# Patient Record
Sex: FEMALE | Race: WHITE | NOT HISPANIC OR LATINO
[De-identification: names, ages, dates, MRNs, and addresses within clinical notes are randomized per-mention and may not be internally consistent; named-entity substitution may affect disease eponyms.]

---

## 2017-01-30 ENCOUNTER — APPOINTMENT (OUTPATIENT)
Dept: OBGYN | Facility: CLINIC | Age: 77
End: 2017-01-30

## 2017-01-30 VITALS — DIASTOLIC BLOOD PRESSURE: 80 MMHG | SYSTOLIC BLOOD PRESSURE: 130 MMHG

## 2017-06-13 ENCOUNTER — APPOINTMENT (OUTPATIENT)
Dept: OBGYN | Facility: CLINIC | Age: 77
End: 2017-06-13

## 2017-06-13 VITALS — SYSTOLIC BLOOD PRESSURE: 120 MMHG | DIASTOLIC BLOOD PRESSURE: 70 MMHG

## 2017-09-28 ENCOUNTER — APPOINTMENT (OUTPATIENT)
Dept: OBGYN | Facility: CLINIC | Age: 77
End: 2017-09-28
Payer: MEDICARE

## 2017-09-28 VITALS — DIASTOLIC BLOOD PRESSURE: 70 MMHG | SYSTOLIC BLOOD PRESSURE: 130 MMHG

## 2017-09-28 DIAGNOSIS — F17.210 NICOTINE DEPENDENCE, CIGARETTES, UNCOMPLICATED: ICD-10-CM

## 2017-09-28 DIAGNOSIS — N81.9 FEMALE GENITAL PROLAPSE, UNSPECIFIED: ICD-10-CM

## 2017-09-28 PROCEDURE — 99214 OFFICE O/P EST MOD 30 MIN: CPT

## 2017-09-28 RX ORDER — CYCLOBENZAPRINE HYDROCHLORIDE 10 MG/1
10 TABLET, FILM COATED ORAL
Qty: 20 | Refills: 0 | Status: COMPLETED | COMMUNITY
Start: 2016-11-15

## 2017-09-28 RX ORDER — ATORVASTATIN CALCIUM 10 MG/1
10 TABLET, FILM COATED ORAL
Qty: 90 | Refills: 0 | Status: ACTIVE | COMMUNITY
Start: 2017-07-19

## 2017-09-28 RX ORDER — LOSARTAN POTASSIUM AND HYDROCHLOROTHIAZIDE 25; 100 MG/1; MG/1
100-25 TABLET ORAL
Qty: 90 | Refills: 0 | Status: ACTIVE | COMMUNITY
Start: 2017-04-04

## 2017-10-02 ENCOUNTER — OTHER (OUTPATIENT)
Age: 77
End: 2017-10-02

## 2018-02-12 ENCOUNTER — APPOINTMENT (OUTPATIENT)
Dept: OBGYN | Facility: CLINIC | Age: 78
End: 2018-02-12
Payer: MEDICARE

## 2018-02-12 VITALS — SYSTOLIC BLOOD PRESSURE: 140 MMHG | DIASTOLIC BLOOD PRESSURE: 80 MMHG

## 2018-02-12 PROCEDURE — 99213 OFFICE O/P EST LOW 20 MIN: CPT

## 2018-02-23 ENCOUNTER — APPOINTMENT (OUTPATIENT)
Dept: OBGYN | Facility: CLINIC | Age: 78
End: 2018-02-23
Payer: MEDICARE

## 2018-02-23 VITALS — DIASTOLIC BLOOD PRESSURE: 80 MMHG | SYSTOLIC BLOOD PRESSURE: 140 MMHG

## 2018-02-23 PROCEDURE — 99213 OFFICE O/P EST LOW 20 MIN: CPT

## 2018-03-02 ENCOUNTER — APPOINTMENT (OUTPATIENT)
Dept: OBGYN | Facility: CLINIC | Age: 78
End: 2018-03-02
Payer: MEDICARE

## 2018-03-02 VITALS — SYSTOLIC BLOOD PRESSURE: 130 MMHG | DIASTOLIC BLOOD PRESSURE: 80 MMHG

## 2018-03-02 PROCEDURE — 99213 OFFICE O/P EST LOW 20 MIN: CPT

## 2018-06-26 ENCOUNTER — APPOINTMENT (OUTPATIENT)
Dept: OBGYN | Facility: CLINIC | Age: 78
End: 2018-06-26
Payer: MEDICARE

## 2018-06-26 VITALS — DIASTOLIC BLOOD PRESSURE: 80 MMHG | SYSTOLIC BLOOD PRESSURE: 130 MMHG

## 2018-06-26 PROCEDURE — 99213 OFFICE O/P EST LOW 20 MIN: CPT

## 2018-10-01 ENCOUNTER — APPOINTMENT (OUTPATIENT)
Dept: OBGYN | Facility: CLINIC | Age: 78
End: 2018-10-01
Payer: MEDICARE

## 2018-10-01 VITALS
BODY MASS INDEX: 21.17 KG/M2 | DIASTOLIC BLOOD PRESSURE: 80 MMHG | SYSTOLIC BLOOD PRESSURE: 135 MMHG | HEIGHT: 64 IN | WEIGHT: 124 LBS

## 2018-10-01 PROCEDURE — 99214 OFFICE O/P EST MOD 30 MIN: CPT

## 2019-03-04 ENCOUNTER — APPOINTMENT (OUTPATIENT)
Dept: OBGYN | Facility: CLINIC | Age: 79
End: 2019-03-04

## 2019-03-07 ENCOUNTER — APPOINTMENT (OUTPATIENT)
Dept: OBGYN | Facility: CLINIC | Age: 79
End: 2019-03-07
Payer: MEDICARE

## 2019-03-07 VITALS
WEIGHT: 124 LBS | BODY MASS INDEX: 21.17 KG/M2 | SYSTOLIC BLOOD PRESSURE: 130 MMHG | DIASTOLIC BLOOD PRESSURE: 80 MMHG | HEIGHT: 64 IN

## 2019-03-07 DIAGNOSIS — N95.0 POSTMENOPAUSAL BLEEDING: ICD-10-CM

## 2019-03-07 PROCEDURE — 99213 OFFICE O/P EST LOW 20 MIN: CPT

## 2019-03-07 NOTE — PHYSICAL EXAM
[Labia Majora] : labia major [Labia Minora] : labia minora [Cystocele] : a cystocele [Uterine Prolapse] : uterine prolapse [Normal] : uterus [Uterine Adnexae] : were not tender and not enlarged [Adnexa Tenderness] : were not tender [Ovarian Mass (___ Cm)] : there were no adnexal masses [FreeTextEntry4] : ring with support removed and cleaned, abrasion in right lateral fornix noted, bleeding.

## 2019-03-19 ENCOUNTER — APPOINTMENT (OUTPATIENT)
Dept: OBGYN | Facility: CLINIC | Age: 79
End: 2019-03-19
Payer: MEDICARE

## 2019-03-19 VITALS — SYSTOLIC BLOOD PRESSURE: 130 MMHG | DIASTOLIC BLOOD PRESSURE: 80 MMHG

## 2019-03-19 PROCEDURE — 99213 OFFICE O/P EST LOW 20 MIN: CPT

## 2019-03-19 NOTE — PHYSICAL EXAM
[Normal] : cervix [Cystocele] : a cystocele [Labia Majora] : labia major [Labia Minora] : labia minora [Uterine Prolapse] : uterine prolapse [Ovarian Mass (___ Cm)] : there were no adnexal masses [Adnexa Tenderness] : were not tender [FreeTextEntry4] : abrasion healed. Ring with support pessary (#5?) reinserted but felt too big to me.

## 2019-03-19 NOTE — CHIEF COMPLAINT
[Follow Up] : follow up GYN visit [FreeTextEntry1] : pt here for fu and reinsertion of pessary. Had tvus in meantime which showed 2mm endo lining and nml ovaries.

## 2019-08-07 ENCOUNTER — APPOINTMENT (OUTPATIENT)
Dept: OBGYN | Facility: CLINIC | Age: 79
End: 2019-08-07
Payer: MEDICARE

## 2019-08-07 VITALS
WEIGHT: 117.72 LBS | DIASTOLIC BLOOD PRESSURE: 70 MMHG | BODY MASS INDEX: 20.1 KG/M2 | HEIGHT: 64 IN | SYSTOLIC BLOOD PRESSURE: 120 MMHG

## 2019-08-07 DIAGNOSIS — Z87.39 PERSONAL HISTORY OF OTHER DISEASES OF THE MUSCULOSKELETAL SYSTEM AND CONNECTIVE TISSUE: ICD-10-CM

## 2019-08-07 PROCEDURE — 99214 OFFICE O/P EST MOD 30 MIN: CPT

## 2019-08-07 NOTE — CHIEF COMPLAINT
[Follow Up] : follow up GYN visit [FreeTextEntry1] : Had BMD done by PMD in June 2019 which showed osteoporosis in right hip. Fosamax recommended by PMD which she was on in the past but 10 yrs ago.\dayanara blevins is working well. No vb.

## 2019-08-07 NOTE — PHYSICAL EXAM
[Normal] : urethra [Labia Majora] : labia major [Labia Minora] : labia minora [Cystocele] : a cystocele [Uterine Prolapse] : uterine prolapse [Adnexa Tenderness] : were not tender [Ovarian Mass (___ Cm)] : there were no adnexal masses [FreeTextEntry4] : No abrasion. Ring with support pessary (#5?) reinserted but felt too big to me.

## 2019-09-18 ENCOUNTER — OTHER (OUTPATIENT)
Age: 79
End: 2019-09-18

## 2019-09-19 ENCOUNTER — OTHER (OUTPATIENT)
Age: 79
End: 2019-09-19

## 2019-09-19 LAB
25(OH)D3 SERPL-MCNC: 38.1 NG/ML
CALCIUM SERPL-MCNC: 11.1 MG/DL

## 2019-11-08 ENCOUNTER — APPOINTMENT (OUTPATIENT)
Dept: OBGYN | Facility: CLINIC | Age: 79
End: 2019-11-08
Payer: MEDICARE

## 2019-11-08 VITALS
HEIGHT: 64 IN | TEMPERATURE: 97.7 F | BODY MASS INDEX: 19.97 KG/M2 | SYSTOLIC BLOOD PRESSURE: 120 MMHG | WEIGHT: 117 LBS | DIASTOLIC BLOOD PRESSURE: 80 MMHG

## 2019-11-08 PROCEDURE — 99213 OFFICE O/P EST LOW 20 MIN: CPT

## 2019-11-08 NOTE — CHIEF COMPLAINT
[Follow Up] : follow up GYN visit [FreeTextEntry1] : pt here for pessary check. Since last visit her  passed from a heart attack and then she broke her hip a few weeks later. She was recently dx with osteoporosis and recommended t rheum because of hypercalcemia but she had to cancel appts because of everything else that was annalise on. Her appt is for Dec 3rd.\par She is happy with pessary, no complaints, no vb.

## 2019-11-08 NOTE — PHYSICAL EXAM
[Labia Majora] : labia major [Normal] : cervix [Labia Minora] : labia minora [Uterine Prolapse] : uterine prolapse [Adnexa Tenderness] : were not tender [Cystocele] : a cystocele [Ovarian Mass (___ Cm)] : there were no adnexal masses [FreeTextEntry4] : No abrasion. Ring with support pessary (#5?)

## 2020-03-05 ENCOUNTER — APPOINTMENT (OUTPATIENT)
Dept: OBGYN | Facility: CLINIC | Age: 80
End: 2020-03-05
Payer: MEDICARE

## 2020-03-05 VITALS
HEIGHT: 64 IN | BODY MASS INDEX: 20.49 KG/M2 | SYSTOLIC BLOOD PRESSURE: 140 MMHG | WEIGHT: 120 LBS | DIASTOLIC BLOOD PRESSURE: 70 MMHG

## 2020-03-05 DIAGNOSIS — Z12.39 ENCOUNTER FOR OTHER SCREENING FOR MALIGNANT NEOPLASM OF BREAST: ICD-10-CM

## 2020-03-05 PROCEDURE — 99213 OFFICE O/P EST LOW 20 MIN: CPT

## 2020-03-05 NOTE — CHIEF COMPLAINT
[Follow Up] : follow up GYN visit [FreeTextEntry1] : Saw Dr. Whyte in December. Started on fosomax.\par No issues with pessary. No vb. overdue for mammo

## 2020-03-05 NOTE — PHYSICAL EXAM
[Normal] : cervix [Labia Majora] : labia major [Labia Minora] : labia minora [Cystocele] : a cystocele [Uterine Prolapse] : uterine prolapse [Adnexa Tenderness] : were not tender [Ovarian Mass (___ Cm)] : there were no adnexal masses [FreeTextEntry4] : No abrasion. Ring with support pessary (#5?) removed, cleaned and replaced with estrogen cream

## 2020-06-25 ENCOUNTER — APPOINTMENT (OUTPATIENT)
Dept: OBGYN | Facility: CLINIC | Age: 80
End: 2020-06-25
Payer: MEDICARE

## 2020-06-25 VITALS — DIASTOLIC BLOOD PRESSURE: 70 MMHG | SYSTOLIC BLOOD PRESSURE: 140 MMHG | RESPIRATION RATE: 16 BRPM | TEMPERATURE: 98.5 F

## 2020-06-25 PROCEDURE — 99214 OFFICE O/P EST MOD 30 MIN: CPT

## 2020-10-08 ENCOUNTER — APPOINTMENT (OUTPATIENT)
Dept: OBGYN | Facility: CLINIC | Age: 80
End: 2020-10-08
Payer: MEDICARE

## 2020-10-08 VITALS
DIASTOLIC BLOOD PRESSURE: 70 MMHG | HEIGHT: 64 IN | BODY MASS INDEX: 20.32 KG/M2 | SYSTOLIC BLOOD PRESSURE: 138 MMHG | RESPIRATION RATE: 16 BRPM | WEIGHT: 119 LBS

## 2020-10-08 PROCEDURE — 99213 OFFICE O/P EST LOW 20 MIN: CPT

## 2020-10-08 NOTE — PHYSICAL EXAM
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

## 2020-10-08 NOTE — HISTORY OF PRESENT ILLNESS
[FreeTextEntry1] : Pt here for pessary check. No vb, happy with it.\par Also has osteoporosis, sees rheum, on fosomax, not on calcium because of elevated levels.\par  [Mammogramdate] : 3/2020 [BoneDensityDate] : 6/2019

## 2020-11-24 ENCOUNTER — TRANSCRIPTION ENCOUNTER (OUTPATIENT)
Age: 80
End: 2020-11-24

## 2021-01-25 ENCOUNTER — APPOINTMENT (OUTPATIENT)
Dept: OBGYN | Facility: CLINIC | Age: 81
End: 2021-01-25
Payer: MEDICARE

## 2021-01-25 VITALS
HEIGHT: 64 IN | OXYGEN SATURATION: 98 % | WEIGHT: 123 LBS | DIASTOLIC BLOOD PRESSURE: 70 MMHG | RESPIRATION RATE: 18 BRPM | SYSTOLIC BLOOD PRESSURE: 120 MMHG | BODY MASS INDEX: 21 KG/M2

## 2021-01-25 DIAGNOSIS — Z12.31 ENCOUNTER FOR SCREENING MAMMOGRAM FOR MALIGNANT NEOPLASM OF BREAST: ICD-10-CM

## 2021-01-25 PROCEDURE — 99214 OFFICE O/P EST MOD 30 MIN: CPT

## 2021-01-25 NOTE — HISTORY OF PRESENT ILLNESS
[TextBox_4] : Being monitored for elevated wbc\par Still on fosamax for bones through rheum, tolerating well. Takes vit d but not calcium due to mildly elevated levels.\par Happy with pessary, no bleeding [Mammogramdate] : 3/2020 [BoneDensityDate] : 6/19 [ColonoscopyDate] : does cologuard q yr 8

## 2021-01-25 NOTE — PHYSICAL EXAM
[Appropriately responsive] : appropriately responsive [Alert] : alert [No Acute Distress] : no acute distress [Soft] : soft [Non-tender] : non-tender [Non-distended] : non-distended [No HSM] : No HSM [No Lesions] : no lesions [No Mass] : no mass [Oriented x3] : oriented x3 [Examination Of The Breasts] : a normal appearance [No Masses] : no breast masses were palpable [Vulvar Atrophy] : vulvar atrophy [Labia Majora] : normal [Labia Minora] : normal [Atrophy] : atrophy [Normal] : normal [Tenderness] : nontender [Enlarged ___ wks] : not enlarged [Mass ___ cm] : no uterine mass was palpated [Uterine Adnexae] : non-palpable [No Tenderness] : no tenderness [FreeTextEntry1] : small (.5cm) pimple/abscess on left labia majora [FreeTextEntry4] : pessary removed, cleaned and replaced, minimal abrasion on left vaginal fornix [FreeTextEntry9] : guaiac-

## 2021-04-26 ENCOUNTER — APPOINTMENT (OUTPATIENT)
Dept: OBGYN | Facility: CLINIC | Age: 81
End: 2021-04-26
Payer: MEDICARE

## 2021-04-26 VITALS
SYSTOLIC BLOOD PRESSURE: 120 MMHG | BODY MASS INDEX: 20.83 KG/M2 | DIASTOLIC BLOOD PRESSURE: 62 MMHG | WEIGHT: 122 LBS | HEIGHT: 64 IN

## 2021-04-26 PROCEDURE — 99213 OFFICE O/P EST LOW 20 MIN: CPT

## 2021-04-26 NOTE — PHYSICAL EXAM
[Appropriately responsive] : appropriately responsive [Alert] : alert [No Acute Distress] : no acute distress [Soft] : soft [Non-tender] : non-tender [Non-distended] : non-distended [No HSM] : No HSM [No Lesions] : no lesions [No Mass] : no mass [Oriented x3] : oriented x3 [Vulvar Atrophy] : vulvar atrophy [Labia Majora] : normal [Labia Minora] : normal [Atrophy] : atrophy [Normal] : normal [Tenderness] : nontender [Enlarged ___ wks] : not enlarged [Mass ___ cm] : no uterine mass was palpated [No Tenderness] : no tenderness [FreeTextEntry4] : pessary removed, cleaned and replaced [FreeTextEntry9] : guaiac-

## 2021-07-26 ENCOUNTER — APPOINTMENT (OUTPATIENT)
Dept: OBGYN | Facility: CLINIC | Age: 81
End: 2021-07-26
Payer: MEDICARE

## 2021-07-26 VITALS
RESPIRATION RATE: 12 BRPM | DIASTOLIC BLOOD PRESSURE: 60 MMHG | WEIGHT: 117 LBS | BODY MASS INDEX: 19.97 KG/M2 | HEIGHT: 64 IN | SYSTOLIC BLOOD PRESSURE: 100 MMHG

## 2021-07-26 PROCEDURE — 99212 OFFICE O/P EST SF 10 MIN: CPT

## 2021-10-25 ENCOUNTER — APPOINTMENT (OUTPATIENT)
Dept: OBGYN | Facility: CLINIC | Age: 81
End: 2021-10-25

## 2021-12-03 ENCOUNTER — APPOINTMENT (OUTPATIENT)
Dept: OBGYN | Facility: CLINIC | Age: 81
End: 2021-12-03
Payer: MEDICARE

## 2021-12-03 VITALS
SYSTOLIC BLOOD PRESSURE: 108 MMHG | DIASTOLIC BLOOD PRESSURE: 62 MMHG | WEIGHT: 115 LBS | TEMPERATURE: 97.7 F | BODY MASS INDEX: 19.74 KG/M2

## 2021-12-03 PROCEDURE — 99213 OFFICE O/P EST LOW 20 MIN: CPT

## 2021-12-03 RX ORDER — ALENDRONATE SODIUM 70 MG/1
70 TABLET ORAL
Refills: 0 | Status: DISCONTINUED | COMMUNITY
Start: 2020-10-08 | End: 2021-12-03

## 2021-12-03 NOTE — PHYSICAL EXAM
[Appropriately responsive] : appropriately responsive [Alert] : alert [No Acute Distress] : no acute distress [Soft] : soft [Non-tender] : non-tender [Non-distended] : non-distended Hide Include Location In Plan Question?: No [No HSM] : No HSM Detail Level: Zone [No Lesions] : no lesions [No Mass] : no mass [Oriented x3] : oriented x3 [Vulvar Atrophy] : vulvar atrophy [Labia Majora] : normal [Labia Minora] : normal [Atrophy] : atrophy [Normal] : normal [Tenderness] : nontender [Enlarged ___ wks] : not enlarged [Mass ___ cm] : no uterine mass was palpated [FreeTextEntry4] : pessary removed, cleaned and replaced with estrogen cream, no abrasions

## 2021-12-03 NOTE — HISTORY OF PRESENT ILLNESS
[FreeTextEntry1] : had colonoscopy and endoscopy, dx with h pylori\par Recent BMD showed osteopenia\par not on fosamax since stomach issues. Has appt with rheum next month to discuss reclast\par Pessary is fine, no vb\par lost weight

## 2022-01-31 ENCOUNTER — APPOINTMENT (OUTPATIENT)
Dept: OBGYN | Facility: CLINIC | Age: 82
End: 2022-01-31

## 2022-02-04 ENCOUNTER — APPOINTMENT (OUTPATIENT)
Dept: OBGYN | Facility: CLINIC | Age: 82
End: 2022-02-04
Payer: MEDICARE

## 2022-02-04 VITALS
WEIGHT: 109 LBS | DIASTOLIC BLOOD PRESSURE: 70 MMHG | TEMPERATURE: 98 F | HEIGHT: 64 IN | BODY MASS INDEX: 18.61 KG/M2 | SYSTOLIC BLOOD PRESSURE: 110 MMHG

## 2022-02-04 PROCEDURE — G0101: CPT

## 2022-02-04 NOTE — PHYSICAL EXAM
[Appropriately responsive] : appropriately responsive [Alert] : alert [No Acute Distress] : no acute distress [Soft] : soft [Non-tender] : non-tender [Non-distended] : non-distended [No HSM] : No HSM [No Lesions] : no lesions [No Mass] : no mass [Oriented x3] : oriented x3 [Examination Of The Breasts] : a normal appearance [No Masses] : no breast masses were palpable [Vulvar Atrophy] : vulvar atrophy [Labia Majora] : normal [Labia Minora] : normal [Atrophy] : atrophy [Normal] : normal [Tenderness] : nontender [Enlarged ___ wks] : not enlarged [Mass ___ cm] : no uterine mass was palpated [Uterine Adnexae] : non-palpable [FreeTextEntry4] : pessary removed, cleaned and replaced with estrogen cream, no abrasions [FreeTextEntry9] : deferred secondary to recent colonoscopy

## 2022-02-04 NOTE — HISTORY OF PRESENT ILLNESS
[TextBox_4] : Switched from fosamax to prolia 2 weeks ago because fosamax wasn’t working.\par  Takes vit d but not calcium due to mildly elevated levels.\par Happy with pessary, no bleeding [Mammogramdate] : 3/2021 [BoneDensityDate] : 1 month ago [ColonoscopyDate] : 10/2021

## 2022-05-06 ENCOUNTER — APPOINTMENT (OUTPATIENT)
Dept: OBGYN | Facility: CLINIC | Age: 82
End: 2022-05-06

## 2022-05-09 ENCOUNTER — APPOINTMENT (OUTPATIENT)
Dept: OBGYN | Facility: CLINIC | Age: 82
End: 2022-05-09
Payer: MEDICARE

## 2022-05-09 VITALS
TEMPERATURE: 97.8 F | SYSTOLIC BLOOD PRESSURE: 135 MMHG | HEART RATE: 88 BPM | BODY MASS INDEX: 19.12 KG/M2 | DIASTOLIC BLOOD PRESSURE: 62 MMHG | WEIGHT: 112 LBS | RESPIRATION RATE: 14 BRPM | HEIGHT: 64 IN

## 2022-05-09 DIAGNOSIS — N81.9 FEMALE GENITAL PROLAPSE, UNSPECIFIED: ICD-10-CM

## 2022-05-09 PROCEDURE — 99213 OFFICE O/P EST LOW 20 MIN: CPT

## 2022-05-09 NOTE — PHYSICAL EXAM
[Appropriately responsive] : appropriately responsive [Alert] : alert [No Acute Distress] : no acute distress [Soft] : soft [Non-tender] : non-tender [Non-distended] : non-distended [No HSM] : No HSM [No Lesions] : no lesions [No Mass] : no mass [Oriented x3] : oriented x3 [Vulvar Atrophy] : vulvar atrophy [Labia Majora] : normal [Labia Minora] : normal [Atrophy] : atrophy [Normal] : normal [Tenderness] : nontender [Enlarged ___ wks] : not enlarged [Mass ___ cm] : no uterine mass was palpated [Uterine Adnexae] : non-palpable [FreeTextEntry4] : pessary removed, cleaned and replaced with estrogen cream, no abrasions [FreeTextEntry9] : deferred secondary to recent colonoscopy

## 2022-08-22 ENCOUNTER — APPOINTMENT (OUTPATIENT)
Dept: OBGYN | Facility: CLINIC | Age: 82
End: 2022-08-22

## 2022-08-22 VITALS
TEMPERATURE: 98 F | BODY MASS INDEX: 18.54 KG/M2 | WEIGHT: 108 LBS | DIASTOLIC BLOOD PRESSURE: 70 MMHG | SYSTOLIC BLOOD PRESSURE: 130 MMHG

## 2022-08-22 PROCEDURE — 99212 OFFICE O/P EST SF 10 MIN: CPT

## 2022-08-22 NOTE — PHYSICAL EXAM
I have bad abdominal pain and I just finished my period. I might have endometriosis but today is was worse than usual - pt [Appropriately responsive] : appropriately responsive [Alert] : alert [No Acute Distress] : no acute distress [Soft] : soft [Non-tender] : non-tender [Non-distended] : non-distended [No HSM] : No HSM [No Lesions] : no lesions [No Mass] : no mass [Oriented x3] : oriented x3 [Vulvar Atrophy] : vulvar atrophy [Labia Majora] : normal [Labia Minora] : normal [Atrophy] : atrophy [Normal] : normal [Tenderness] : nontender [Enlarged ___ wks] : not enlarged [Mass ___ cm] : no uterine mass was palpated [Uterine Adnexae] : non-palpable [FreeTextEntry4] : pessary removed, cleaned and replaced with estrogen cream, no abrasions [FreeTextEntry9] : deferred secondary to recent colonoscopy

## 2022-11-21 ENCOUNTER — APPOINTMENT (OUTPATIENT)
Dept: OBGYN | Facility: CLINIC | Age: 82
End: 2022-11-21

## 2022-11-21 VITALS
WEIGHT: 102 LBS | SYSTOLIC BLOOD PRESSURE: 132 MMHG | HEIGHT: 64 IN | HEART RATE: 92 BPM | BODY MASS INDEX: 17.42 KG/M2 | TEMPERATURE: 98.2 F | DIASTOLIC BLOOD PRESSURE: 77 MMHG | RESPIRATION RATE: 22 BRPM

## 2022-11-21 PROCEDURE — 99213 OFFICE O/P EST LOW 20 MIN: CPT

## 2022-11-21 NOTE — HISTORY OF PRESENT ILLNESS
[FreeTextEntry1] : Pt here for pessary check no complaints\par wants to do prolia here instead of with rheum because its too far.\par Next dose due in january and will get with rheum.

## 2022-11-21 NOTE — PHYSICAL EXAM
[Appropriately responsive] : appropriately responsive [Alert] : alert [No Acute Distress] : no acute distress [Soft] : soft [Non-tender] : non-tender [Non-distended] : non-distended [No HSM] : No HSM [No Lesions] : no lesions [No Mass] : no mass [Oriented x3] : oriented x3 [Vulvar Atrophy] : vulvar atrophy [Labia Majora] : normal [Labia Minora] : normal [Atrophy] : atrophy [Normal] : normal [Tenderness] : nontender [Enlarged ___ wks] : not enlarged [Mass ___ cm] : no uterine mass was palpated [FreeTextEntry4] : pessary removed, cleaned and replaced with estrogen cream, no abrasions

## 2023-01-23 ENCOUNTER — APPOINTMENT (OUTPATIENT)
Dept: OBGYN | Facility: CLINIC | Age: 83
End: 2023-01-23
Payer: MEDICARE

## 2023-01-23 VITALS
SYSTOLIC BLOOD PRESSURE: 132 MMHG | WEIGHT: 101 LBS | BODY MASS INDEX: 17.34 KG/M2 | TEMPERATURE: 98 F | DIASTOLIC BLOOD PRESSURE: 76 MMHG

## 2023-01-23 DIAGNOSIS — M81.0 AGE-RELATED OSTEOPOROSIS W/OUT CURRENT PATHOLOGICAL FRACTURE: ICD-10-CM

## 2023-01-23 PROCEDURE — 99214 OFFICE O/P EST MOD 30 MIN: CPT

## 2023-01-23 NOTE — PHYSICAL EXAM
[Appropriately responsive] : appropriately responsive [Alert] : alert [No Acute Distress] : no acute distress [Soft] : soft [Non-tender] : non-tender [Non-distended] : non-distended [No HSM] : No HSM [No Lesions] : no lesions [No Mass] : no mass [Oriented x3] : oriented x3 [Examination Of The Breasts] : a normal appearance [No Masses] : no breast masses were palpable [Vulvar Atrophy] : vulvar atrophy [Labia Majora] : normal [Labia Minora] : normal [Atrophy] : atrophy [Normal] : normal [Tenderness] : nontender [Enlarged ___ wks] : not enlarged [Mass ___ cm] : no uterine mass was palpated [Uterine Adnexae] : non-palpable [No Tenderness] : no tenderness [FreeTextEntry4] : pessary removed, cleaned and replaced with estrogen cream, no abrasions [FreeTextEntry9] : guaiac-

## 2023-01-23 NOTE — HISTORY OF PRESENT ILLNESS
[TextBox_4] : On prolia now through rheum for osteoporosis, has appt next week for injection.\par Takes vit d but not calcium due to mildly elevated levels.\par Happy with pessary, no bleeding [Mammogramdate] : 3/2022 [BoneDensityDate] :  2021 [ColonoscopyDate] : 10/2021

## 2023-05-15 ENCOUNTER — APPOINTMENT (OUTPATIENT)
Dept: OBGYN | Facility: CLINIC | Age: 83
End: 2023-05-15
Payer: MEDICARE

## 2023-05-15 VITALS
RESPIRATION RATE: 18 BRPM | HEIGHT: 64 IN | WEIGHT: 104 LBS | HEART RATE: 88 BPM | DIASTOLIC BLOOD PRESSURE: 78 MMHG | BODY MASS INDEX: 17.75 KG/M2 | SYSTOLIC BLOOD PRESSURE: 130 MMHG | TEMPERATURE: 98.2 F

## 2023-05-15 PROCEDURE — 99213 OFFICE O/P EST LOW 20 MIN: CPT

## 2023-05-15 RX ORDER — ESTRADIOL 0.1 MG/G
0.1 CREAM VAGINAL
Qty: 1 | Refills: 0 | Status: ACTIVE | COMMUNITY
Start: 2017-06-13 | End: 1900-01-01

## 2023-05-15 NOTE — HISTORY OF PRESENT ILLNESS
[FreeTextEntry1] : Pt here for pessary check no complaints\par no vb, needs more estrace cream.\par Hasn't done mammo yet

## 2023-10-23 ENCOUNTER — APPOINTMENT (OUTPATIENT)
Dept: OBGYN | Facility: CLINIC | Age: 83
End: 2023-10-23
Payer: MEDICARE

## 2023-10-23 ENCOUNTER — APPOINTMENT (OUTPATIENT)
Dept: OBGYN | Facility: CLINIC | Age: 83
End: 2023-10-23

## 2023-10-23 VITALS
DIASTOLIC BLOOD PRESSURE: 80 MMHG | WEIGHT: 102 LBS | SYSTOLIC BLOOD PRESSURE: 130 MMHG | BODY MASS INDEX: 17.42 KG/M2 | HEART RATE: 78 BPM | HEIGHT: 64 IN | RESPIRATION RATE: 16 BRPM

## 2023-10-23 PROCEDURE — 99212 OFFICE O/P EST SF 10 MIN: CPT

## 2023-10-25 ENCOUNTER — OFFICE (OUTPATIENT)
Dept: URBAN - METROPOLITAN AREA CLINIC 12 | Facility: CLINIC | Age: 83
Setting detail: OPHTHALMOLOGY
End: 2023-10-25
Payer: MEDICARE

## 2023-10-25 DIAGNOSIS — H43.393: ICD-10-CM

## 2023-10-25 DIAGNOSIS — H35.033: ICD-10-CM

## 2023-10-25 DIAGNOSIS — H18.513: ICD-10-CM

## 2023-10-25 DIAGNOSIS — H40.032: ICD-10-CM

## 2023-10-25 PROCEDURE — 92014 COMPRE OPH EXAM EST PT 1/>: CPT | Performed by: STUDENT IN AN ORGANIZED HEALTH CARE EDUCATION/TRAINING PROGRAM

## 2023-10-25 ASSESSMENT — REFRACTION_MANIFEST
OS_SPHERE: +0.50
OD_VA1: 20/25-2
OD_AXIS: 85
OS_CYLINDER: -1.75
OS_AXIS: 87
OD_SPHERE: -0.25
OD_ADD: +2.50
OD_CYLINDER: -1.50
OS_VA1: 20/20-2
OS_ADD: +2.50

## 2023-10-25 ASSESSMENT — KERATOMETRY
OS_AXISANGLE_DEGREES: 100
OD_AXISANGLE_DEGREES: 064
METHOD_AUTO_MANUAL: AUTO
OD_K2POWER_DIOPTERS: 44.00
OS_K1POWER_DIOPTERS: 42.50
OD_K1POWER_DIOPTERS: 43.50
OS_K2POWER_DIOPTERS: 44.25

## 2023-10-25 ASSESSMENT — VISUAL ACUITY
OD_BCVA: 20/60
OS_BCVA: 20/50

## 2023-10-25 ASSESSMENT — TONOMETRY
OS_IOP_MMHG: 12
OD_IOP_MMHG: 12

## 2023-10-25 ASSESSMENT — AXIALLENGTH_DERIVED
OD_AL: 23.8935
OS_AL: 23.7855
OS_AL: 23.8351
OD_AL: 23.8935

## 2023-10-25 ASSESSMENT — REFRACTION_CURRENTRX
OD_OVR_VA: 20/
OS_VPRISM_DIRECTION: SV
OD_VPRISM_DIRECTION: SV
OS_SPHERE: +1.50
OD_SPHERE: +1.50
OS_OVR_VA: 20/

## 2023-10-25 ASSESSMENT — SPHEQUIV_DERIVED
OD_SPHEQUIV: -1
OS_SPHEQUIV: -0.5
OS_SPHEQUIV: -0.375
OD_SPHEQUIV: -1

## 2023-10-25 ASSESSMENT — CORNEAL DYSTROPHY - POSTERIOR
OD_POSTERIORDYSTROPHY: GUTTATA
OS_POSTERIORDYSTROPHY: GUTTATA

## 2023-10-25 ASSESSMENT — REFRACTION_AUTOREFRACTION
OS_AXIS: 087
OS_SPHERE: +0.50
OD_CYLINDER: -1.50
OD_SPHERE: -0.25
OD_AXIS: 084
OS_CYLINDER: -2.00

## 2023-10-25 ASSESSMENT — CONFRONTATIONAL VISUAL FIELD TEST (CVF)
OS_FINDINGS: FULL
OD_FINDINGS: FULL

## 2024-02-09 ENCOUNTER — APPOINTMENT (OUTPATIENT)
Dept: OBGYN | Facility: CLINIC | Age: 84
End: 2024-02-09
Payer: MEDICARE

## 2024-02-09 VITALS
WEIGHT: 100 LBS | DIASTOLIC BLOOD PRESSURE: 60 MMHG | BODY MASS INDEX: 17.07 KG/M2 | SYSTOLIC BLOOD PRESSURE: 110 MMHG | HEART RATE: 75 BPM | RESPIRATION RATE: 14 BRPM | HEIGHT: 64 IN

## 2024-02-09 DIAGNOSIS — Z01.419 ENCOUNTER FOR GYNECOLOGICAL EXAMINATION (GENERAL) (ROUTINE) W/OUT ABNORMAL FINDINGS: ICD-10-CM

## 2024-02-09 LAB
CARD LOT #: NORMAL
CARD LOT EXP DATE: NORMAL
DATE COLLECTED: NORMAL
DATE COLLECTED: NORMAL
DEVELOPER LOT #: NORMAL
DEVELOPER LOT EXP DATE: NORMAL
HEMOCCULT 2: NEGATIVE
HEMOCCULT SP1 STL QL: NEGATIVE
QUALITY CONTROL: YES
QUALITY CONTROL: YES

## 2024-02-09 PROCEDURE — G0101: CPT

## 2024-02-09 NOTE — PHYSICAL EXAM
[Appropriately responsive] : appropriately responsive [Alert] : alert [No Acute Distress] : no acute distress [Soft] : soft [Non-tender] : non-tender [Non-distended] : non-distended [No HSM] : No HSM [No Lesions] : no lesions [No Mass] : no mass [Oriented x3] : oriented x3 [Examination Of The Breasts] : a normal appearance [No Masses] : no breast masses were palpable [Vulvar Atrophy] : vulvar atrophy [Labia Majora] : normal [Labia Minora] : normal [Atrophy] : atrophy [Normal] : normal [Tenderness] : nontender [Enlarged ___ wks] : not enlarged [Mass ___ cm] : no uterine mass was palpated [No Tenderness] : no tenderness [FreeTextEntry4] : pessary removed, cleaned and replaced, no abrasions [FreeTextEntry9] : guaiac-

## 2024-02-09 NOTE — HISTORY OF PRESENT ILLNESS
[TextBox_4] : On prolia now through rheum for osteoporosis. Takes vit d but not calcium due to mildly elevated levels. Happy with pessary, no bleeding [Mammogramdate] : 5/2023 [BoneDensityDate] :  2021 [ColonoscopyDate] : 10/2021

## 2024-05-09 ENCOUNTER — APPOINTMENT (OUTPATIENT)
Dept: OBGYN | Facility: CLINIC | Age: 84
End: 2024-05-09
Payer: MEDICARE

## 2024-05-09 VITALS
HEIGHT: 64 IN | DIASTOLIC BLOOD PRESSURE: 60 MMHG | WEIGHT: 100 LBS | SYSTOLIC BLOOD PRESSURE: 100 MMHG | BODY MASS INDEX: 17.07 KG/M2

## 2024-05-09 DIAGNOSIS — Z46.89 ENCOUNTER FOR FITTING AND ADJUSTMENT OF OTHER SPECIFIED DEVICES: ICD-10-CM

## 2024-05-09 PROCEDURE — 99212 OFFICE O/P EST SF 10 MIN: CPT

## 2024-05-09 NOTE — PHYSICAL EXAM
[Appropriately responsive] : appropriately responsive [Alert] : alert [No Acute Distress] : no acute distress [Vulvar Atrophy] : vulvar atrophy [Labia Majora] : normal [Labia Minora] : normal [Atrophy] : atrophy [Normal] : normal [FreeTextEntry4] : pessary removed, cleaned and replaced, no abrasions

## 2024-09-05 ENCOUNTER — APPOINTMENT (OUTPATIENT)
Dept: OBGYN | Facility: CLINIC | Age: 84
End: 2024-09-05
Payer: MEDICARE

## 2024-09-05 VITALS
BODY MASS INDEX: 15.88 KG/M2 | RESPIRATION RATE: 18 BRPM | SYSTOLIC BLOOD PRESSURE: 108 MMHG | DIASTOLIC BLOOD PRESSURE: 60 MMHG | HEIGHT: 64 IN | WEIGHT: 93 LBS | HEART RATE: 78 BPM

## 2024-09-05 DIAGNOSIS — Z46.89 ENCOUNTER FOR FITTING AND ADJUSTMENT OF OTHER SPECIFIED DEVICES: ICD-10-CM

## 2024-09-05 PROCEDURE — 99213 OFFICE O/P EST LOW 20 MIN: CPT

## 2024-11-12 ENCOUNTER — OFFICE (OUTPATIENT)
Dept: URBAN - METROPOLITAN AREA CLINIC 12 | Facility: CLINIC | Age: 84
Setting detail: OPHTHALMOLOGY
End: 2024-11-12
Payer: MEDICARE

## 2024-11-12 DIAGNOSIS — H40.032: ICD-10-CM

## 2024-11-12 DIAGNOSIS — Z96.1: ICD-10-CM

## 2024-11-12 DIAGNOSIS — H18.513: ICD-10-CM

## 2024-11-12 DIAGNOSIS — H52.7: ICD-10-CM

## 2024-11-12 DIAGNOSIS — H35.033: ICD-10-CM

## 2024-11-12 DIAGNOSIS — H43.393: ICD-10-CM

## 2024-11-12 PROCEDURE — 92250 FUNDUS PHOTOGRAPHY W/I&R: CPT | Performed by: STUDENT IN AN ORGANIZED HEALTH CARE EDUCATION/TRAINING PROGRAM

## 2024-11-12 PROCEDURE — 92014 COMPRE OPH EXAM EST PT 1/>: CPT | Performed by: STUDENT IN AN ORGANIZED HEALTH CARE EDUCATION/TRAINING PROGRAM

## 2024-11-12 ASSESSMENT — CONFRONTATIONAL VISUAL FIELD TEST (CVF)
OS_FINDINGS: FULL
OD_FINDINGS: FULL

## 2024-11-12 ASSESSMENT — REFRACTION_MANIFEST
OD_VA1: 20/25-2
OD_AXIS: 85
OD_CYLINDER: -1.50
OS_ADD: +2.50
OS_CYLINDER: -1.75
OD_ADD: +2.50
OD_SPHERE: -0.25
OS_VA1: 20/20-2
OS_SPHERE: +0.50
OS_AXIS: 87

## 2024-11-12 ASSESSMENT — VISUAL ACUITY
OS_BCVA: 20/30-1
OD_BCVA: 20/40-2

## 2024-11-12 ASSESSMENT — REFRACTION_AUTOREFRACTION
OS_CYLINDER: -2.25
OS_AXIS: 087
OD_SPHERE: -0.25
OD_CYLINDER: -1.50
OS_SPHERE: +1.00
OD_AXIS: 088

## 2024-11-12 ASSESSMENT — CORNEAL DYSTROPHY - POSTERIOR
OD_POSTERIORDYSTROPHY: GUTTATA
OS_POSTERIORDYSTROPHY: GUTTATA

## 2024-11-12 ASSESSMENT — REFRACTION_CURRENTRX
OD_OVR_VA: 20/
OS_SPHERE: +1.50
OS_VPRISM_DIRECTION: SV
OD_VPRISM_DIRECTION: SV
OD_SPHERE: +1.50
OS_OVR_VA: 20/

## 2024-11-12 ASSESSMENT — KERATOMETRY
OD_AXISANGLE_DEGREES: 178
OS_AXISANGLE_DEGREES: 007
METHOD_AUTO_MANUAL: AUTO
OD_K1POWER_DIOPTERS: 43.50
OS_K1POWER_DIOPTERS: 43.00
OD_K2POWER_DIOPTERS: 44.00
OS_K2POWER_DIOPTERS: 44.25

## 2024-11-12 ASSESSMENT — TONOMETRY
OS_IOP_MMHG: 12
OD_IOP_MMHG: 11

## 2025-01-08 ENCOUNTER — APPOINTMENT (OUTPATIENT)
Dept: OBGYN | Facility: CLINIC | Age: 85
End: 2025-01-08
Payer: MEDICARE

## 2025-01-08 VITALS
BODY MASS INDEX: 15.36 KG/M2 | RESPIRATION RATE: 20 BRPM | WEIGHT: 90 LBS | HEART RATE: 80 BPM | DIASTOLIC BLOOD PRESSURE: 66 MMHG | HEIGHT: 64 IN | SYSTOLIC BLOOD PRESSURE: 110 MMHG

## 2025-01-08 DIAGNOSIS — Z46.89 ENCOUNTER FOR FITTING AND ADJUSTMENT OF OTHER SPECIFIED DEVICES: ICD-10-CM

## 2025-01-08 PROCEDURE — 99213 OFFICE O/P EST LOW 20 MIN: CPT

## 2025-01-08 RX ORDER — ROMOSOZUMAB-AQQG 105 MG/1.17ML
105 INJECTION, SOLUTION SUBCUTANEOUS
Refills: 0 | Status: ACTIVE | COMMUNITY
Start: 2025-01-08

## 2025-02-06 ENCOUNTER — APPOINTMENT (OUTPATIENT)
Dept: OBGYN | Facility: CLINIC | Age: 85
End: 2025-02-06

## 2025-02-10 ENCOUNTER — APPOINTMENT (OUTPATIENT)
Dept: OBGYN | Facility: CLINIC | Age: 85
End: 2025-02-10

## 2025-04-14 ENCOUNTER — APPOINTMENT (OUTPATIENT)
Dept: OBGYN | Facility: CLINIC | Age: 85
End: 2025-04-14
Payer: MEDICARE

## 2025-04-14 VITALS
SYSTOLIC BLOOD PRESSURE: 108 MMHG | WEIGHT: 94 LBS | HEART RATE: 78 BPM | BODY MASS INDEX: 16.05 KG/M2 | DIASTOLIC BLOOD PRESSURE: 60 MMHG | RESPIRATION RATE: 18 BRPM | HEIGHT: 64 IN

## 2025-04-14 DIAGNOSIS — Z01.419 ENCOUNTER FOR GYNECOLOGICAL EXAMINATION (GENERAL) (ROUTINE) W/OUT ABNORMAL FINDINGS: ICD-10-CM

## 2025-04-14 DIAGNOSIS — N89.8 OTHER SPECIFIED NONINFLAMMATORY DISORDERS OF VAGINA: ICD-10-CM

## 2025-04-14 DIAGNOSIS — Z87.42 PERSONAL HISTORY OF OTHER DISEASES OF THE FEMALE GENITAL TRACT: ICD-10-CM

## 2025-04-14 DIAGNOSIS — M81.0 AGE-RELATED OSTEOPOROSIS W/OUT CURRENT PATHOLOGICAL FRACTURE: ICD-10-CM

## 2025-04-14 DIAGNOSIS — Z46.89 ENCOUNTER FOR FITTING AND ADJUSTMENT OF OTHER SPECIFIED DEVICES: ICD-10-CM

## 2025-04-14 PROCEDURE — 99214 OFFICE O/P EST MOD 30 MIN: CPT

## 2025-06-25 PROBLEM — H16.223 KERATOCONJUNCTIVITIS SICCA; BOTH EYES: Status: ACTIVE | Noted: 2025-06-25

## 2025-08-01 ENCOUNTER — APPOINTMENT (OUTPATIENT)
Dept: RADIOLOGY | Facility: CLINIC | Age: 85
End: 2025-08-01

## 2025-08-18 ENCOUNTER — APPOINTMENT (OUTPATIENT)
Dept: OBGYN | Facility: CLINIC | Age: 85
End: 2025-08-18